# Patient Record
Sex: MALE | Race: BLACK OR AFRICAN AMERICAN | Employment: FULL TIME | URBAN - METROPOLITAN AREA
[De-identification: names, ages, dates, MRNs, and addresses within clinical notes are randomized per-mention and may not be internally consistent; named-entity substitution may affect disease eponyms.]

---

## 2019-04-16 ENCOUNTER — HOSPITAL ENCOUNTER (EMERGENCY)
Age: 59
Discharge: HOME | End: 2019-04-16
Attending: EMERGENCY MEDICINE
Payer: COMMERCIAL

## 2019-04-16 VITALS
WEIGHT: 251 LBS | RESPIRATION RATE: 14 BRPM | SYSTOLIC BLOOD PRESSURE: 135 MMHG | OXYGEN SATURATION: 97 % | HEART RATE: 103 BPM | BODY MASS INDEX: 32.21 KG/M2 | TEMPERATURE: 97.6 F | DIASTOLIC BLOOD PRESSURE: 78 MMHG | HEIGHT: 74 IN

## 2019-04-16 DIAGNOSIS — T78.3XXA ANGIOEDEMA, INITIAL ENCOUNTER: Primary | ICD-10-CM

## 2019-04-16 DIAGNOSIS — R03.0 ELEVATED BLOOD PRESSURE READING: ICD-10-CM

## 2019-04-16 PROCEDURE — 96374 THER/PROPH/DIAG INJ IV PUSH: CPT

## 2019-04-16 PROCEDURE — 2580000003 HC RX 258: Performed by: EMERGENCY MEDICINE

## 2019-04-16 PROCEDURE — 2500000003 HC RX 250 WO HCPCS: Performed by: EMERGENCY MEDICINE

## 2019-04-16 PROCEDURE — 6360000002 HC RX W HCPCS: Performed by: EMERGENCY MEDICINE

## 2019-04-16 PROCEDURE — 6360000002 HC RX W HCPCS

## 2019-04-16 PROCEDURE — 99282 EMERGENCY DEPT VISIT SF MDM: CPT

## 2019-04-16 PROCEDURE — 96375 TX/PRO/DX INJ NEW DRUG ADDON: CPT

## 2019-04-16 PROCEDURE — 96361 HYDRATE IV INFUSION ADD-ON: CPT

## 2019-04-16 RX ORDER — DIPHENHYDRAMINE HYDROCHLORIDE 50 MG/ML
50 INJECTION INTRAMUSCULAR; INTRAVENOUS ONCE
Status: COMPLETED | OUTPATIENT
Start: 2019-04-16 | End: 2019-04-16

## 2019-04-16 RX ORDER — 0.9 % SODIUM CHLORIDE 0.9 %
1000 INTRAVENOUS SOLUTION INTRAVENOUS ONCE
Status: COMPLETED | OUTPATIENT
Start: 2019-04-16 | End: 2019-04-16

## 2019-04-16 RX ORDER — METHYLPREDNISOLONE SODIUM SUCCINATE 125 MG/2ML
INJECTION, POWDER, LYOPHILIZED, FOR SOLUTION INTRAMUSCULAR; INTRAVENOUS
Status: COMPLETED
Start: 2019-04-16 | End: 2019-04-16

## 2019-04-16 RX ORDER — DIPHENHYDRAMINE HCL 25 MG
25-50 CAPSULE ORAL EVERY 6 HOURS PRN
Qty: 20 CAPSULE | Refills: 0 | Status: SHIPPED | OUTPATIENT
Start: 2019-04-16 | End: 2019-04-26

## 2019-04-16 RX ORDER — METHYLPREDNISOLONE SODIUM SUCCINATE 125 MG/2ML
125 INJECTION, POWDER, LYOPHILIZED, FOR SOLUTION INTRAMUSCULAR; INTRAVENOUS DAILY
Status: DISCONTINUED | OUTPATIENT
Start: 2019-04-16 | End: 2019-04-16 | Stop reason: HOSPADM

## 2019-04-16 RX ORDER — AMLODIPINE BESYLATE 5 MG/1
5 TABLET ORAL DAILY
Qty: 15 TABLET | Refills: 2 | Status: ON HOLD | OUTPATIENT
Start: 2019-04-16 | End: 2021-11-13 | Stop reason: HOSPADM

## 2019-04-16 RX ORDER — FAMOTIDINE 20 MG/1
20 TABLET, FILM COATED ORAL 2 TIMES DAILY
Qty: 6 TABLET | Refills: 0 | Status: ON HOLD | OUTPATIENT
Start: 2019-04-16 | End: 2021-11-13 | Stop reason: HOSPADM

## 2019-04-16 RX ORDER — PREDNISONE 20 MG/1
60 TABLET ORAL DAILY
Qty: 9 TABLET | Refills: 0 | Status: SHIPPED | OUTPATIENT
Start: 2019-04-16 | End: 2019-04-19

## 2019-04-16 RX ADMIN — METHYLPREDNISOLONE SODIUM SUCCINATE 125 MG: 125 INJECTION, POWDER, FOR SOLUTION INTRAMUSCULAR; INTRAVENOUS at 06:39

## 2019-04-16 RX ADMIN — METHYLPREDNISOLONE SODIUM SUCCINATE 125 MG: 125 INJECTION, POWDER, LYOPHILIZED, FOR SOLUTION INTRAMUSCULAR; INTRAVENOUS at 06:39

## 2019-04-16 RX ADMIN — DIPHENHYDRAMINE HYDROCHLORIDE 50 MG: 50 INJECTION, SOLUTION INTRAMUSCULAR; INTRAVENOUS at 06:38

## 2019-04-16 RX ADMIN — FAMOTIDINE 20 MG: 10 INJECTION, SOLUTION INTRAVENOUS at 06:38

## 2019-04-16 RX ADMIN — SODIUM CHLORIDE 1000 ML: 9 INJECTION, SOLUTION INTRAVENOUS at 06:38

## 2019-04-16 NOTE — ED PROVIDER NOTES
TRIAGE CHIEF COMPLAINT:   Chief Complaint   Patient presents with    Facial Swelling     Swollen lower lip and hands for 12 hours. HPI: Sean Gentile is a 62 y.o. male who presents to the emergency department with complaint of Swelling of his lower lip as well as swelling and itching of his hands. Patient has been on Zestril for about 10 years. This is his third episode of  Facial swelling since October of last year. His primary care doctor is not aware of these episodes. The patient states the most recent episode flared up yesterday afternoon while he was work starting on the left side of his lower lip. By 7 PM it had spread across the lower lip. He did take some Benadryl around 5 PM last night and again it 9:30 PM last night. He denies upper lip swelling this time but previously he has had it in the upper lip. Denies tongue swelling. He states his cheeks are both swollen. He noted some swelling and itching of his hands which he has never had before. Denies any leg swelling. No rash. Denies chest pain or shortness of breath. The patient took his scheduled dose of Zestril 40 mg this morning at 5:30a.m. and then came here. He denies any new or unusual exposures. He is not on any new medications. REVIEW OF SYSTEMS:   10 systems reviewed. Pertinent positives per HPI. Otherwise noted to be negative. I have reviewed the triage/nursing documentation and agree unless otherwise noted below. PAST MEDICAL HISTORY:   Past Medical History:   Diagnosis Date    Diabetes mellitus ([facility] [address]     Gout     Hyperlipidemia     Hypertension         CURRENT MEDICATIONS:   Patient's Medications   New Prescriptions    No medications on file   Previous Medications    ALLOPURINOL PO    Take  by mouth. HYDROCHLOROTHIAZIDE (HYDRODIURIL) 12.5 MG TABLET    Take 12.5 mg by mouth daily. LISINOPRIL (PRINIVIL;ZESTRIL) 40 MG TABLET    Take 40 mg by mouth daily.     METFORMIN (GLUCOPHAGE) 500 MG TABLET    Take 500 lip.  He does have some swelling of the face and hands which is new. Complains of some mild itching of the hands. No respiratory distress. Lungs are clear. Tongue is normal.  He was treated here with IV fluids, IV Solu-Medrol, Benadryl and Pepcid. Cool compresses were placed to the swollen lower lip. Patient was advised that he needed to stop the ACE inhibitor. He was observed for over 2 hours. There was no worsening of his symptoms. He had slight improvement of the swelling of the lower lip. There was no tongue swelling or stridor. No difficulty swallowing. The floor the mouth was normal.  No upper lip  Swelling was present. Lung sounds were clear on repeat exam.  Recheck blood pressure was 135/78, heart rate 103 and room air O2 sat 100%. The patient was given a three-day course of prednisone, Benadryl and Pepcid in the event that this was an allergic phenomenon but I feel it more likely to be ACE inhibitor induced. The patient understands to stop his lisinopril. He was given a prescription for Norvasc and advised to continue all of his other medications. Advise follow-up with his primary care doctor. He understands to return here for worsening symptoms. I discussed with Miguelina Guzman III the exam results, diagnosis, care, prognosis and the importance of follow-up. The patient is stable for discharge. The patient and/or family are in agreement with the plan and all questions have been answered. Specific discharge instructions were explained, including reasons to return to the emergency department.       (Please note that portions of this note may have been completed with a voice recognition program.  Efforts were made to edit the dictation but occasionally words are mis-transcribed)      FINAL IMPRESSION:  1 -- angioedema, recurrent  2 -- essential hypertension                  Gris Coleman MD  04/16/19 0342

## 2019-04-16 NOTE — ED NOTES
Patient resting quietly watching TV, ice pack given for lip swelling patient has no request.     Nette Sultana, RN  04/16/19 3662

## 2019-06-30 ENCOUNTER — HOSPITAL ENCOUNTER (EMERGENCY)
Age: 59
Discharge: HOME | End: 2019-06-30
Attending: EMERGENCY MEDICINE
Payer: COMMERCIAL

## 2019-06-30 ENCOUNTER — APPOINTMENT (OUTPATIENT)
Dept: GENERAL RADIOLOGY | Age: 59
End: 2019-06-30
Payer: COMMERCIAL

## 2019-06-30 VITALS
HEIGHT: 74 IN | SYSTOLIC BLOOD PRESSURE: 140 MMHG | TEMPERATURE: 98.1 F | BODY MASS INDEX: 32.42 KG/M2 | DIASTOLIC BLOOD PRESSURE: 91 MMHG | HEART RATE: 108 BPM | OXYGEN SATURATION: 99 % | RESPIRATION RATE: 14 BRPM | WEIGHT: 252.6 LBS

## 2019-06-30 DIAGNOSIS — M25.561 ACUTE PAIN OF RIGHT KNEE: ICD-10-CM

## 2019-06-30 DIAGNOSIS — M70.51 PATELLAR BURSITIS OF RIGHT KNEE: Primary | ICD-10-CM

## 2019-06-30 PROCEDURE — 99283 EMERGENCY DEPT VISIT LOW MDM: CPT

## 2019-06-30 PROCEDURE — 73562 X-RAY EXAM OF KNEE 3: CPT

## 2019-06-30 ASSESSMENT — PAIN DESCRIPTION - FREQUENCY: FREQUENCY: INTERMITTENT

## 2019-06-30 ASSESSMENT — PAIN DESCRIPTION - ORIENTATION
ORIENTATION: RIGHT
ORIENTATION: RIGHT

## 2019-06-30 ASSESSMENT — PAIN DESCRIPTION - LOCATION
LOCATION: KNEE
LOCATION: KNEE

## 2019-06-30 ASSESSMENT — PAIN SCALES - GENERAL
PAINLEVEL_OUTOF10: 6
PAINLEVEL_OUTOF10: 6

## 2019-06-30 ASSESSMENT — PAIN DESCRIPTION - PAIN TYPE
TYPE: CHRONIC PAIN
TYPE: CHRONIC PAIN

## 2019-06-30 ASSESSMENT — PAIN DESCRIPTION - DESCRIPTORS
DESCRIPTORS: ACHING
DESCRIPTORS: ACHING

## 2019-06-30 NOTE — ED PROVIDER NOTES
no facial asymmetry, no nasal discharge  Eyes: EOM  Neck: No tracheal deviation or stridor  Skin: no pallor, erythema, lesions or other abnormalities on exposed skin of face and arms  Extremities:   R knee: patella palpated and in normal position with normal turgor, good extensor function of lower extremity albiet at pain at insertion of patellar tendon  Valgus (lateral) stress: no laxity of MCL or significant pain  Varus (medial) stress: no laxity of LCL or significant pain  Anterior/posterior Drawer tests: no laxity of ACL/PCL or significant pain    Neurologic: Alert, oriented x 3. No focal deficits upon moving arms and legs  Psychiatric: Appropriate demeanor without agitation or internal stimulation      MEDICAL DECISION MAKING  Pt here with isolated R knee injury. No evidence of significant neurovascular injury. Suspect patellar tendonitis    Knee immob    Xr:   1. Tricompartmental osteoarthritis, greatest involving the patellofemoral articulation  2. Moderate joint effusion  3. Patellar enthesopathy    Pt declines knee immobilizer    Suspect patellar tendonitis    Images reviewed, patient counseled, and knows to follow up with orthopedics in 1 week or less, especially if symptoms persist, and to follow up sooner if they worsen. DISPOSITION  Home    IMPRESSION:  R knee pain  Patellar bursitis      This medical chart used with aid of transcription software. As such, there may be inadvertent errors in transcription of spellings and words despite physician's attempts to correct all possible errors.          Jason Tena MD  06/30/19 6685

## 2021-11-12 ENCOUNTER — HOSPITAL ENCOUNTER (OUTPATIENT)
Age: 61
Setting detail: OBSERVATION
Discharge: HOME | End: 2021-11-13
Attending: EMERGENCY MEDICINE | Admitting: INTERNAL MEDICINE
Payer: COMMERCIAL

## 2021-11-12 ENCOUNTER — APPOINTMENT (OUTPATIENT)
Dept: GENERAL RADIOLOGY | Age: 61
End: 2021-11-12
Payer: COMMERCIAL

## 2021-11-12 DIAGNOSIS — R42 DIZZINESS: Primary | ICD-10-CM

## 2021-11-12 DIAGNOSIS — R77.8 ELEVATED TROPONIN: ICD-10-CM

## 2021-11-12 DIAGNOSIS — E87.6 HYPOKALEMIA: ICD-10-CM

## 2021-11-12 LAB
A/G RATIO: 1 (ref 1.1–2.2)
ALBUMIN SERPL-MCNC: 3.7 G/DL (ref 3.4–5)
ALP BLD-CCNC: 238 U/L (ref 40–129)
ALT SERPL-CCNC: 52 U/L (ref 10–40)
ANION GAP SERPL CALCULATED.3IONS-SCNC: 11 MMOL/L (ref 3–16)
AST SERPL-CCNC: 100 U/L (ref 15–37)
BASOPHILS ABSOLUTE: 0.1 K/UL (ref 0–0.2)
BASOPHILS RELATIVE PERCENT: 1.4 %
BILIRUB SERPL-MCNC: 0.5 MG/DL (ref 0–1)
BUN BLDV-MCNC: 14 MG/DL (ref 7–20)
CALCIUM SERPL-MCNC: 9.3 MG/DL (ref 8.3–10.6)
CHLORIDE BLD-SCNC: 103 MMOL/L (ref 99–110)
CO2: 24 MMOL/L (ref 21–32)
CREAT SERPL-MCNC: 1.2 MG/DL (ref 0.8–1.3)
EKG ATRIAL RATE: 95 BPM
EKG DIAGNOSIS: NORMAL
EKG P AXIS: 63 DEGREES
EKG P-R INTERVAL: 232 MS
EKG Q-T INTERVAL: 368 MS
EKG QRS DURATION: 106 MS
EKG QTC CALCULATION (BAZETT): 462 MS
EKG R AXIS: -26 DEGREES
EKG T AXIS: 18 DEGREES
EKG VENTRICULAR RATE: 95 BPM
EOSINOPHILS ABSOLUTE: 0 K/UL (ref 0–0.6)
EOSINOPHILS RELATIVE PERCENT: 0.5 %
GFR AFRICAN AMERICAN: >60
GFR NON-AFRICAN AMERICAN: >60
GLUCOSE BLD-MCNC: 191 MG/DL (ref 70–99)
GLUCOSE BLD-MCNC: 82 MG/DL (ref 70–99)
HCT VFR BLD CALC: 33.4 % (ref 40.5–52.5)
HEMOGLOBIN: 11.1 G/DL (ref 13.5–17.5)
LYMPHOCYTES ABSOLUTE: 1.8 K/UL (ref 1–5.1)
LYMPHOCYTES RELATIVE PERCENT: 23.7 %
MAGNESIUM: 1.9 MG/DL (ref 1.8–2.4)
MCH RBC QN AUTO: 27.1 PG (ref 26–34)
MCHC RBC AUTO-ENTMCNC: 33.2 G/DL (ref 31–36)
MCV RBC AUTO: 81.8 FL (ref 80–100)
MONOCYTES ABSOLUTE: 0.8 K/UL (ref 0–1.3)
MONOCYTES RELATIVE PERCENT: 10.4 %
NEUTROPHILS ABSOLUTE: 5 K/UL (ref 1.7–7.7)
NEUTROPHILS RELATIVE PERCENT: 64 %
PDW BLD-RTO: 17 % (ref 12.4–15.4)
PERFORMED ON: NORMAL
PLATELET # BLD: 218 K/UL (ref 135–450)
PMV BLD AUTO: 8.8 FL (ref 5–10.5)
POTASSIUM REFLEX MAGNESIUM: 3.2 MMOL/L (ref 3.5–5.1)
RBC # BLD: 4.08 M/UL (ref 4.2–5.9)
SODIUM BLD-SCNC: 138 MMOL/L (ref 136–145)
TOTAL PROTEIN: 7.4 G/DL (ref 6.4–8.2)
TROPONIN: 0.02 NG/ML
TROPONIN: 0.03 NG/ML
TROPONIN: 0.03 NG/ML
WBC # BLD: 7.8 K/UL (ref 4–11)

## 2021-11-12 PROCEDURE — 6360000002 HC RX W HCPCS: Performed by: INTERNAL MEDICINE

## 2021-11-12 PROCEDURE — 36415 COLL VENOUS BLD VENIPUNCTURE: CPT

## 2021-11-12 PROCEDURE — 83735 ASSAY OF MAGNESIUM: CPT

## 2021-11-12 PROCEDURE — 2580000003 HC RX 258: Performed by: INTERNAL MEDICINE

## 2021-11-12 PROCEDURE — 96372 THER/PROPH/DIAG INJ SC/IM: CPT

## 2021-11-12 PROCEDURE — 80053 COMPREHEN METABOLIC PANEL: CPT

## 2021-11-12 PROCEDURE — 84484 ASSAY OF TROPONIN QUANT: CPT

## 2021-11-12 PROCEDURE — 85025 COMPLETE CBC W/AUTO DIFF WBC: CPT

## 2021-11-12 PROCEDURE — 6370000000 HC RX 637 (ALT 250 FOR IP): Performed by: INTERNAL MEDICINE

## 2021-11-12 PROCEDURE — G0378 HOSPITAL OBSERVATION PER HR: HCPCS

## 2021-11-12 PROCEDURE — 71045 X-RAY EXAM CHEST 1 VIEW: CPT

## 2021-11-12 PROCEDURE — 93010 ELECTROCARDIOGRAM REPORT: CPT | Performed by: INTERNAL MEDICINE

## 2021-11-12 PROCEDURE — 6370000000 HC RX 637 (ALT 250 FOR IP): Performed by: EMERGENCY MEDICINE

## 2021-11-12 PROCEDURE — 99285 EMERGENCY DEPT VISIT HI MDM: CPT

## 2021-11-12 PROCEDURE — 93005 ELECTROCARDIOGRAM TRACING: CPT | Performed by: EMERGENCY MEDICINE

## 2021-11-12 RX ORDER — SODIUM CHLORIDE 9 MG/ML
25 INJECTION, SOLUTION INTRAVENOUS PRN
Status: DISCONTINUED | OUTPATIENT
Start: 2021-11-12 | End: 2021-11-13 | Stop reason: HOSPADM

## 2021-11-12 RX ORDER — ACETAMINOPHEN 325 MG/1
650 TABLET ORAL EVERY 6 HOURS PRN
Status: DISCONTINUED | OUTPATIENT
Start: 2021-11-12 | End: 2021-11-13 | Stop reason: HOSPADM

## 2021-11-12 RX ORDER — DIGOXIN 125 MCG
0.12 TABLET ORAL DAILY
COMMUNITY

## 2021-11-12 RX ORDER — INSULIN LISPRO 100 [IU]/ML
0-12 INJECTION, SOLUTION INTRAVENOUS; SUBCUTANEOUS
Status: DISCONTINUED | OUTPATIENT
Start: 2021-11-12 | End: 2021-11-13 | Stop reason: HOSPADM

## 2021-11-12 RX ORDER — CARVEDILOL 25 MG/1
25 TABLET ORAL 2 TIMES DAILY WITH MEALS
COMMUNITY

## 2021-11-12 RX ORDER — ASPIRIN 81 MG/1
81 TABLET, CHEWABLE ORAL DAILY
Status: DISCONTINUED | OUTPATIENT
Start: 2021-11-12 | End: 2021-11-13 | Stop reason: HOSPADM

## 2021-11-12 RX ORDER — ASPIRIN 81 MG/1
324 TABLET, CHEWABLE ORAL ONCE
Status: COMPLETED | OUTPATIENT
Start: 2021-11-12 | End: 2021-11-12

## 2021-11-12 RX ORDER — MECLIZINE HCL 12.5 MG/1
12.5 TABLET ORAL 3 TIMES DAILY PRN
COMMUNITY
Start: 2021-06-24

## 2021-11-12 RX ORDER — MECLIZINE HYDROCHLORIDE 25 MG/1
25 TABLET ORAL ONCE
Status: COMPLETED | OUTPATIENT
Start: 2021-11-12 | End: 2021-11-12

## 2021-11-12 RX ORDER — DIAPER,BRIEF,INFANT-TODD,DISP
EACH MISCELLANEOUS 2 TIMES DAILY
COMMUNITY

## 2021-11-12 RX ORDER — POLYETHYLENE GLYCOL 3350 17 G/17G
17 POWDER, FOR SOLUTION ORAL DAILY PRN
Status: DISCONTINUED | OUTPATIENT
Start: 2021-11-12 | End: 2021-11-13 | Stop reason: HOSPADM

## 2021-11-12 RX ORDER — ASPIRIN 81 MG/1
81 TABLET, CHEWABLE ORAL DAILY
COMMUNITY

## 2021-11-12 RX ORDER — ACETAMINOPHEN 650 MG/1
650 SUPPOSITORY RECTAL EVERY 6 HOURS PRN
Status: DISCONTINUED | OUTPATIENT
Start: 2021-11-12 | End: 2021-11-13 | Stop reason: HOSPADM

## 2021-11-12 RX ORDER — NICOTINE POLACRILEX 4 MG
15 LOZENGE BUCCAL PRN
Status: DISCONTINUED | OUTPATIENT
Start: 2021-11-12 | End: 2021-11-13 | Stop reason: HOSPADM

## 2021-11-12 RX ORDER — INSULIN LISPRO 100 [IU]/ML
0-6 INJECTION, SOLUTION INTRAVENOUS; SUBCUTANEOUS NIGHTLY
Status: DISCONTINUED | OUTPATIENT
Start: 2021-11-12 | End: 2021-11-13 | Stop reason: HOSPADM

## 2021-11-12 RX ORDER — SODIUM CHLORIDE 0.9 % (FLUSH) 0.9 %
5-40 SYRINGE (ML) INJECTION PRN
Status: DISCONTINUED | OUTPATIENT
Start: 2021-11-12 | End: 2021-11-13 | Stop reason: HOSPADM

## 2021-11-12 RX ORDER — POTASSIUM CHLORIDE 20 MEQ/1
40 TABLET, EXTENDED RELEASE ORAL 2 TIMES DAILY WITH MEALS
Status: DISCONTINUED | OUTPATIENT
Start: 2021-11-12 | End: 2021-11-13 | Stop reason: HOSPADM

## 2021-11-12 RX ORDER — ONDANSETRON 2 MG/ML
4 INJECTION INTRAMUSCULAR; INTRAVENOUS EVERY 6 HOURS PRN
Status: DISCONTINUED | OUTPATIENT
Start: 2021-11-12 | End: 2021-11-13 | Stop reason: HOSPADM

## 2021-11-12 RX ORDER — CARVEDILOL 25 MG/1
25 TABLET ORAL 2 TIMES DAILY WITH MEALS
Status: DISCONTINUED | OUTPATIENT
Start: 2021-11-12 | End: 2021-11-13 | Stop reason: HOSPADM

## 2021-11-12 RX ORDER — DICLOFENAC SODIUM 1 MG/ML
1 SOLUTION/ DROPS OPHTHALMIC 3 TIMES DAILY
COMMUNITY

## 2021-11-12 RX ORDER — MECOBALAMIN 5000 MCG
5 TABLET,DISINTEGRATING ORAL NIGHTLY
Status: DISCONTINUED | OUTPATIENT
Start: 2021-11-12 | End: 2021-11-13 | Stop reason: HOSPADM

## 2021-11-12 RX ORDER — ISOSORBIDE DINITRATE 40 MG/1
40 TABLET ORAL 3 TIMES DAILY
COMMUNITY
Start: 2021-08-09

## 2021-11-12 RX ORDER — HYDROCHLOROTHIAZIDE 25 MG/1
50 TABLET ORAL DAILY
Status: DISCONTINUED | OUTPATIENT
Start: 2021-11-12 | End: 2021-11-13 | Stop reason: HOSPADM

## 2021-11-12 RX ORDER — MECLIZINE HYDROCHLORIDE 25 MG/1
12.5 TABLET ORAL 3 TIMES DAILY PRN
Status: DISCONTINUED | OUTPATIENT
Start: 2021-11-12 | End: 2021-11-13 | Stop reason: HOSPADM

## 2021-11-12 RX ORDER — POTASSIUM CHLORIDE 750 MG/1
10 TABLET, FILM COATED, EXTENDED RELEASE ORAL DAILY
COMMUNITY

## 2021-11-12 RX ORDER — DEXTROSE MONOHYDRATE 25 G/50ML
12.5 INJECTION, SOLUTION INTRAVENOUS PRN
Status: DISCONTINUED | OUTPATIENT
Start: 2021-11-12 | End: 2021-11-13 | Stop reason: HOSPADM

## 2021-11-12 RX ORDER — ALLOPURINOL 300 MG/1
300 TABLET ORAL DAILY
Status: DISCONTINUED | OUTPATIENT
Start: 2021-11-12 | End: 2021-11-13 | Stop reason: HOSPADM

## 2021-11-12 RX ORDER — HYDRALAZINE HYDROCHLORIDE 50 MG/1
50 TABLET, FILM COATED ORAL EVERY 6 HOURS SCHEDULED
Status: DISCONTINUED | OUTPATIENT
Start: 2021-11-12 | End: 2021-11-13 | Stop reason: HOSPADM

## 2021-11-12 RX ORDER — ONDANSETRON 4 MG/1
4 TABLET, ORALLY DISINTEGRATING ORAL EVERY 8 HOURS PRN
Status: DISCONTINUED | OUTPATIENT
Start: 2021-11-12 | End: 2021-11-13 | Stop reason: HOSPADM

## 2021-11-12 RX ORDER — CHLORPROMAZINE HYDROCHLORIDE 25 MG/1
25 TABLET, FILM COATED ORAL DAILY
COMMUNITY
Start: 2021-09-14

## 2021-11-12 RX ORDER — DIGOXIN 125 MCG
125 TABLET ORAL DAILY
Status: DISCONTINUED | OUTPATIENT
Start: 2021-11-12 | End: 2021-11-13 | Stop reason: HOSPADM

## 2021-11-12 RX ORDER — ISOSORBIDE DINITRATE 20 MG/1
40 TABLET ORAL 3 TIMES DAILY
Status: DISCONTINUED | OUTPATIENT
Start: 2021-11-12 | End: 2021-11-13 | Stop reason: HOSPADM

## 2021-11-12 RX ORDER — DAPAGLIFLOZIN 10 MG/1
10 TABLET, FILM COATED ORAL DAILY
COMMUNITY
Start: 2021-10-22

## 2021-11-12 RX ORDER — SODIUM CHLORIDE 0.9 % (FLUSH) 0.9 %
5-40 SYRINGE (ML) INJECTION EVERY 12 HOURS SCHEDULED
Status: DISCONTINUED | OUTPATIENT
Start: 2021-11-12 | End: 2021-11-13 | Stop reason: HOSPADM

## 2021-11-12 RX ORDER — ALPRAZOLAM 0.5 MG/1
0.5 TABLET ORAL NIGHTLY PRN
Status: DISCONTINUED | OUTPATIENT
Start: 2021-11-13 | End: 2021-11-12

## 2021-11-12 RX ORDER — FOLIC ACID 1 MG/1
1 TABLET ORAL DAILY
COMMUNITY

## 2021-11-12 RX ORDER — DEXTROSE MONOHYDRATE 50 MG/ML
100 INJECTION, SOLUTION INTRAVENOUS PRN
Status: DISCONTINUED | OUTPATIENT
Start: 2021-11-12 | End: 2021-11-13 | Stop reason: HOSPADM

## 2021-11-12 RX ORDER — POTASSIUM CHLORIDE 750 MG/1
40 TABLET, EXTENDED RELEASE ORAL 2 TIMES DAILY
Status: DISCONTINUED | OUTPATIENT
Start: 2021-11-12 | End: 2021-11-12 | Stop reason: SDUPTHER

## 2021-11-12 RX ORDER — ALPRAZOLAM 0.5 MG/1
0.5 TABLET ORAL NIGHTLY PRN
Status: DISCONTINUED | OUTPATIENT
Start: 2021-11-12 | End: 2021-11-13 | Stop reason: HOSPADM

## 2021-11-12 RX ORDER — HYDRALAZINE HYDROCHLORIDE 50 MG/1
50 TABLET, FILM COATED ORAL EVERY 6 HOURS
COMMUNITY
Start: 2021-05-25

## 2021-11-12 RX ADMIN — HYDRALAZINE HYDROCHLORIDE 50 MG: 50 TABLET, FILM COATED ORAL at 20:56

## 2021-11-12 RX ADMIN — Medication 5 MG: at 23:11

## 2021-11-12 RX ADMIN — ENOXAPARIN SODIUM 40 MG: 100 INJECTION SUBCUTANEOUS at 21:13

## 2021-11-12 RX ADMIN — CARVEDILOL 25 MG: 25 TABLET, FILM COATED ORAL at 20:55

## 2021-11-12 RX ADMIN — ALPRAZOLAM 0.5 MG: 0.5 TABLET ORAL at 23:10

## 2021-11-12 RX ADMIN — ISOSORBIDE DINITRATE 40 MG: 20 TABLET ORAL at 20:54

## 2021-11-12 RX ADMIN — DIGOXIN 125 MCG: 125 TABLET ORAL at 20:55

## 2021-11-12 RX ADMIN — ASPIRIN 324 MG: 81 TABLET, CHEWABLE ORAL at 09:02

## 2021-11-12 RX ADMIN — ALLOPURINOL 300 MG: 300 TABLET ORAL at 20:55

## 2021-11-12 RX ADMIN — Medication 10 ML: at 20:00

## 2021-11-12 RX ADMIN — HYDROCHLOROTHIAZIDE 50 MG: 25 TABLET ORAL at 20:54

## 2021-11-12 RX ADMIN — MECLIZINE HYDROCHLORIDE 25 MG: 25 TABLET ORAL at 08:27

## 2021-11-12 RX ADMIN — HYDRALAZINE HYDROCHLORIDE 50 MG: 50 TABLET, FILM COATED ORAL at 23:10

## 2021-11-12 RX ADMIN — MECLIZINE HYDROCHLORIDE 12.5 MG: 25 TABLET ORAL at 20:54

## 2021-11-12 RX ADMIN — POTASSIUM CHLORIDE 40 MEQ: 750 TABLET, EXTENDED RELEASE ORAL at 09:02

## 2021-11-12 RX ADMIN — POTASSIUM CHLORIDE 40 MEQ: 1500 TABLET, EXTENDED RELEASE ORAL at 20:55

## 2021-11-12 RX ADMIN — ASPIRIN 81 MG: 81 TABLET, CHEWABLE ORAL at 20:55

## 2021-11-12 ASSESSMENT — PAIN SCALES - GENERAL: PAINLEVEL_OUTOF10: 0

## 2021-11-12 NOTE — ED NOTES
This RN called nursing supervisor at [employer] in regard to bed status, she reported that it would be late afternoon early evening.  Pt made aware, attempted to notify transfer center without success       Andriy Pratt RN  11/12/21 7725

## 2021-11-12 NOTE — ED NOTES
Transfer center on the line with Dr. Alondra Alegria to speak with Dr. Deloris Carranza.      Alejo Cowan, [facility]  11/12/21 7165

## 2021-11-12 NOTE — ED PROVIDER NOTES
Emergency Physician Note        Note Open Time: 8:17 AM EST    Chief Complaint  Dizziness       History of Present Illness  Wicho Rubin III is a 64 y.o. male who presents to the ED for dizziness. Patient reports that this morning on the way to work he felt very dizzy while driving and decided to stop and turn around and come in for evaluation. He denies any chest pain or shortness of breath. No palpitations, nausea or any diaphoresis. He states he did not feel this way during the night or yesterday after getting up today felt dizzy. He has had similar symptoms in the past and does have meclizine at home. He denies any headache. No history of strokes or TIAs. No neck pain. No fevers, chills or sweats. He states while lying still in the bed he has no issue but if he were to get up and walk around he would feel dizzy. 10 systems reviewed, pertinent positives per HPI otherwise noted to be negative    I have reviewed the following from the nursing documentation:      Prior to Admission medications    Medication Sig Start Date End Date Taking? Authorizing Provider   aspirin 81 MG chewable tablet Take 81 mg by mouth daily   Yes Historical Provider, MD   carvedilol (COREG) 25 MG tablet Take 25 mg by mouth 2 times daily (with meals)   Yes Historical Provider, MD   Dapagliflozin-metFORMIN HCl ER  MG TB24 Take by mouth   Yes Historical Provider, MD   diclofenac (VOLTAREN) 0.1 % ophthalmic solution 1 drop 3 times daily   Yes Historical Provider, MD   digoxin (LANOXIN) 125 MCG tablet Take 0.125 mcg by mouth daily   Yes Historical Provider, MD   folic acid (FOLVITE) 1 MG tablet Take 1 mg by mouth daily   Yes Historical Provider, MD   hydrocortisone 1 % cream Apply topically 2 times daily Apply topically 2 times daily.    Yes Historical Provider, MD   famotidine (PEPCID) 20 MG tablet Take 1 tablet by mouth 2 times daily for 3 days 4/16/19 4/19/19  Zhen Encinas MD   amLODIPine (NORVASC) 5 MG tablet Take 1 tablet by mouth daily for 15 days 4/16/19 5/1/19  Michelet Edawrds MD   metFORMIN (GLUCOPHAGE) 500 MG tablet Take 500 mg by mouth 2 times daily (with meals). Historical Provider, MD   ALLOPURINOL PO Take 300 mg by mouth daily     Historical Provider, MD   hydrochlorothiazide (HYDRODIURIL) 12.5 MG tablet Take 50 mg by mouth daily     Historical Provider, MD   Multiple Vitamins-Minerals (CENTRUM SILVER PO) Take  by mouth. Historical Provider, MD       Allergies as of 11/12/2021 - Fully Reviewed 11/12/2021   Allergen Reaction Noted    Amlodipine Other (See Comments) 02/27/2018    Lisinopril  04/16/2019       Past Medical History:   Diagnosis Date    Diabetes mellitus ([facility] [address]     Gout     Heart failure ([facility] [address]     Hyperlipidemia     Hypertension         Surgical History:   Past Surgical History:   Procedure Laterality Date    BACK SURGERY      FOOT SURGERY      KNEE SURGERY          Family History:  History reviewed. No pertinent family history. Social History     Socioeconomic History    Marital status: Single     Spouse name: Not on file    Number of children: Not on file    Years of education: Not on file    Highest education level: Not on file   Occupational History    Not on file   Tobacco Use    Smoking status: Former Smoker    Smokeless tobacco: Never Used   Substance and Sexual Activity    Alcohol use: Not Currently     Comment: daily beers    Drug use: No    Sexual activity: Not on file   Other Topics Concern    Not on file   Social History Narrative    Not on file     Social Determinants of Health     Financial Resource Strain:     Difficulty of Paying Living Expenses: Not on file   Food Insecurity:     Worried About [address] in the Last Year: Not on file    Jossie of Food in the Last Year: Not on file   Transportation Needs:     Lack of Transportation (Medical): Not on file    Lack of Transportation (Non-Medical):  Not on file   Physical Activity:     Days of Exercise per Week: Not on file    Minutes of Exercise per Session: Not on file   Stress:     Feeling of Stress : Not on file   Social Connections:     Frequency of Communication with Friends and Family: Not on file    Frequency of Social Gatherings with Friends and Family: Not on file    Attends Jew Services: Not on file    Active Member of [address] [place] [address] [employer] or Organizations: Not on file    Attends Club or Organization Meetings: Not on file    Marital Status: Not on file   Intimate Partner Violence:     Fear of Current or Ex-Partner: Not on file    Emotionally Abused: Not on file    Physically Abused: Not on file    Sexually Abused: Not on file   Housing Stability:     Unable to Pay for Housing in the Last Year: Not on file    Number of [place] in the Last Year: Not on file    Unstable Housing in the Last Year: Not on file       Nursing notes reviewed. ED Triage Vitals [11/12/21 0752]   Enc Vitals Group      BP (!) 129/90      Pulse 92      Resp 18      Temp 98.2 °F (36.8 °C)      Temp Source Oral      SpO2 97 %      Weight 216 lb (98 kg)      Height 6' 2\" (1.88 m)      Head Circumference       Peak Flow       Pain Score       Pain Loc       Pain Edu? Excl. in [address]? GENERAL:  Awake, alert. Well developed, well nourished with no apparent distress. HENT:  Normocephalic, Atraumatic, moist mucous membranes. Posterior oropharynx without erythema, edema or exudate. Bilateral TMs without erythema or effusion. [place]-Hallpike maneuver positive to the right and negative to the left. EYES:  Pupils equal round and reactive to light, Conjunctiva normal, extraocular movements normal.  NECK:  No meningeal signs, Supple. CHEST:  Regular rate and rhythm, chest wall non-tender. LUNGS:  Clear to auscultation bilaterally. ABDOMEN:  Soft, non-tender, no rebound, rigidity or guarding, non-distended, normal bowel sounds. No costovertebral angle tenderness to palpation. BACK:  No tenderness. EXTREMITIES:  Normal range of motion, no edema, no bony tenderness, no deformity, distal pulses present. SKIN: Warm, dry and intact. NEUROLOGIC: Erwin Reynolds III's NIH Stroke Scale is:  Level of Consciousness:  0 - alert; keenly responsive  LOC Questions:  0 - answers both questions correctly  LOC Commands:  0 - performs both tasks correctly  Best Gaze:  0 - normal  Visual Fields:  0 - no visual loss  Facial Palsy:  0 - normal symmetric movement  Motor-Arm-Left:  0 - no drift, limb holds 90 (or 45) degrees for full 10 seconds  Motor-Leg-Left:  0 - no drift; leg holds 30 degree position for full 5 seconds  Motor-Arm-Right:  0 - no drift, limb holds 90 (or 45) degrees for full 10 seconds  Motor-Leg-Right:  0 - no drift; leg holds 30 degree position for full 5 seconds  Limb Ataxia:  0 - absent  Sensory:  0 - normal; no sensory loss  Best Language:  0 - no aphasia, normal  Dysarthria:  0 - normal  Extinction and Inattention:  0 - no abnormality  Reassuring hints exam.    LABS and DIAGNOSTIC RESULTS  EKG  The Ekg interpreted by me shows  normal sinus rhythm with a rate of 95  Axis is   Normal  QTc is  normal  First-degree AV block     ST Segments: normal  Delta waves, Brugada Syndrome, and Short IN are not present. No prior EKG available for comparison. RADIOLOGY  X-RAYS:  I have reviewed radiologic plain film image(s). ALL OTHER NON-PLAIN FILM IMAGES SUCH AS CT, ULTRASOUND AND MRI HAVE BEEN READ BY THE RADIOLOGIST. XR CHEST PORTABLE   Final Result      1. No acute disease.                  LABS  Labs Reviewed   CBC WITH AUTO DIFFERENTIAL - Abnormal; Notable for the following components:       Result Value    RBC 4.08 (*)     Hemoglobin 11.1 (*)     Hematocrit 33.4 (*)     RDW 17.0 (*)     All other components within normal limits    Narrative:     Performed at:  [facility]) - [facility]  [address],  [address]   Phone [phone]   COMPREHENSIVE METABOLIC PANEL W/ REFLEX TO MG FOR LOW K - Abnormal; Notable for the following components:    Potassium reflex Magnesium 3.2 (*)     Glucose 191 (*)     Albumin/Globulin Ratio 1.0 (*)     Alkaline Phosphatase 238 (*)     ALT 52 (*)      (*)     All other components within normal limits    Narrative:     Performed at:  [place] [address],  [employer] [facility] [employer]   Phone [phone]   TROPONIN - Abnormal; Notable for the following components:    Troponin 0.03 (*)     All other components within normal limits    Narrative:     Performed at:  [place] [address]   Phone [phone]   TROPONIN - Abnormal; Notable for the following components:    Troponin 0.03 (*)     All other components within normal limits    Narrative:     Performed at:  [place] [address] [employer]   Phone [phone]   MAGNESIUM    Narrative:     Performed at:  [place] [address]  [employer] [facility] [employer]   Phone [phone]        I spoke with the nurse practitioner on-call for Dr. Maryann Hodgkins, cardiology, and she stated they would see the patient in consultation if the patient was admitted to telemetry at [facility]. I then spoke with the hospitalist at [facility] accepted the patient in transfer. After waiting for several hours to be transferred to [facility] we were informed that he may not get a bed there until at least sometime tomorrow and at this point I discussed with the patient again and he agreed his transportation and transferred to [place] [address] time is 30 minutes which excludes separately billable procedures.   The critical care was concerning treatment of hypokalemia with replacement and treatment of elevated troponin with

## 2021-11-12 NOTE — ED NOTES
Pt informed that transfer center stated PERE[facility] pruitt said late tonight early tomorrow, pt states he would rather go to [facility]  11/12/21 3139

## 2021-11-13 VITALS
TEMPERATURE: 98.8 F | RESPIRATION RATE: 18 BRPM | BODY MASS INDEX: 28.38 KG/M2 | WEIGHT: 221.12 LBS | SYSTOLIC BLOOD PRESSURE: 121 MMHG | DIASTOLIC BLOOD PRESSURE: 65 MMHG | HEIGHT: 74 IN | OXYGEN SATURATION: 97 % | HEART RATE: 88 BPM

## 2021-11-13 LAB
ALBUMIN SERPL-MCNC: 3.3 G/DL (ref 3.4–5)
ALP BLD-CCNC: 219 U/L (ref 40–129)
ALT SERPL-CCNC: 55 U/L (ref 10–40)
ANION GAP SERPL CALCULATED.3IONS-SCNC: 13 MMOL/L (ref 3–16)
AST SERPL-CCNC: 107 U/L (ref 15–37)
BILIRUB SERPL-MCNC: 0.7 MG/DL (ref 0–1)
BILIRUBIN DIRECT: 0.3 MG/DL (ref 0–0.3)
BILIRUBIN, INDIRECT: 0.4 MG/DL (ref 0–1)
BUN BLDV-MCNC: 11 MG/DL (ref 7–20)
CALCIUM SERPL-MCNC: 8.6 MG/DL (ref 8.3–10.6)
CHLORIDE BLD-SCNC: 102 MMOL/L (ref 99–110)
CO2: 23 MMOL/L (ref 21–32)
CREAT SERPL-MCNC: 1.2 MG/DL (ref 0.8–1.3)
GFR AFRICAN AMERICAN: >60
GFR NON-AFRICAN AMERICAN: >60
GLUCOSE BLD-MCNC: 105 MG/DL (ref 70–99)
GLUCOSE BLD-MCNC: 119 MG/DL (ref 70–99)
GLUCOSE BLD-MCNC: 130 MG/DL (ref 70–99)
HCT VFR BLD CALC: 34.7 % (ref 40.5–52.5)
HEMOGLOBIN: 11 G/DL (ref 13.5–17.5)
MAGNESIUM: 1.8 MG/DL (ref 1.8–2.4)
MCH RBC QN AUTO: 26.6 PG (ref 26–34)
MCHC RBC AUTO-ENTMCNC: 31.8 G/DL (ref 31–36)
MCV RBC AUTO: 83.7 FL (ref 80–100)
PDW BLD-RTO: 17.5 % (ref 12.4–15.4)
PERFORMED ON: ABNORMAL
PERFORMED ON: ABNORMAL
PLATELET # BLD: 193 K/UL (ref 135–450)
PMV BLD AUTO: 8.7 FL (ref 5–10.5)
POTASSIUM REFLEX MAGNESIUM: 3.5 MMOL/L (ref 3.5–5.1)
RBC # BLD: 4.15 M/UL (ref 4.2–5.9)
SODIUM BLD-SCNC: 138 MMOL/L (ref 136–145)
TOTAL PROTEIN: 6.9 G/DL (ref 6.4–8.2)
WBC # BLD: 7.2 K/UL (ref 4–11)

## 2021-11-13 PROCEDURE — 99205 OFFICE O/P NEW HI 60 MIN: CPT | Performed by: INTERNAL MEDICINE

## 2021-11-13 PROCEDURE — 85027 COMPLETE CBC AUTOMATED: CPT

## 2021-11-13 PROCEDURE — 6370000000 HC RX 637 (ALT 250 FOR IP): Performed by: INTERNAL MEDICINE

## 2021-11-13 PROCEDURE — 36415 COLL VENOUS BLD VENIPUNCTURE: CPT

## 2021-11-13 PROCEDURE — 2580000003 HC RX 258: Performed by: INTERNAL MEDICINE

## 2021-11-13 PROCEDURE — 80048 BASIC METABOLIC PNL TOTAL CA: CPT

## 2021-11-13 PROCEDURE — G0378 HOSPITAL OBSERVATION PER HR: HCPCS

## 2021-11-13 PROCEDURE — 83735 ASSAY OF MAGNESIUM: CPT

## 2021-11-13 PROCEDURE — 80076 HEPATIC FUNCTION PANEL: CPT

## 2021-11-13 PROCEDURE — 99219 PR INITIAL OBSERVATION CARE/DAY 50 MINUTES: CPT | Performed by: OTOLARYNGOLOGY

## 2021-11-13 RX ADMIN — ISOSORBIDE DINITRATE 40 MG: 20 TABLET ORAL at 08:57

## 2021-11-13 RX ADMIN — HYDRALAZINE HYDROCHLORIDE 50 MG: 50 TABLET, FILM COATED ORAL at 11:59

## 2021-11-13 RX ADMIN — Medication 5 ML: at 08:57

## 2021-11-13 RX ADMIN — ISOSORBIDE DINITRATE 40 MG: 20 TABLET ORAL at 13:36

## 2021-11-13 RX ADMIN — ASPIRIN 81 MG: 81 TABLET, CHEWABLE ORAL at 08:57

## 2021-11-13 RX ADMIN — HYDROCHLOROTHIAZIDE 50 MG: 25 TABLET ORAL at 08:57

## 2021-11-13 RX ADMIN — CARVEDILOL 25 MG: 25 TABLET, FILM COATED ORAL at 08:57

## 2021-11-13 RX ADMIN — HYDRALAZINE HYDROCHLORIDE 50 MG: 50 TABLET, FILM COATED ORAL at 05:57

## 2021-11-13 RX ADMIN — POTASSIUM CHLORIDE 40 MEQ: 1500 TABLET, EXTENDED RELEASE ORAL at 08:57

## 2021-11-13 RX ADMIN — ALLOPURINOL 300 MG: 300 TABLET ORAL at 08:57

## 2021-11-13 RX ADMIN — DIGOXIN 125 MCG: 125 TABLET ORAL at 08:57

## 2021-11-13 ASSESSMENT — PAIN SCALES - GENERAL
PAINLEVEL_OUTOF10: 0

## 2021-11-13 NOTE — CARE COORDINATION
Case Management Assessment            Discharge Note                    Date / Time of Note: 11/13/2021 12:42 PM                  Discharge Note Completed by: Steven Burkett RN    Patient Name: Mariposa Jensen III   YOB: 1960  Diagnosis: Hypokalemia [E87.6]  Dizziness [R42]  Elevated troponin [R77.8]   Date / Time: 11/12/2021  7:42 AM    Current PCP: Toño Marks MD  Clinic patient: No    Hospitalization in the last 30 days: No    Advance Directives:  Code Status: Full Code  [place] DNR form completed and on chart: Not Indicated    Financial:  Payor: Faye [address] 150 / Plan: Mamadou Robertson / Product Type: *No Product type* /      Pharmacy:  No Pharmacies [facility] medications?:    Assistance provided by Case Management: None at this time    Does patient want to participate in local refill/ meds to beds program?:      Meds To [place] Rules:  1. Can ONLY be done Monday- Friday between 8:30am-5pm  2. Prescription(s) must be in pharmacy by 3pm to be filled same day  3. Copy of patient's insurance/ prescription drug card and patient face sheet must be sent along with the prescription(s)  4. Cost of Rx cannot be added to hospital bill. If financial assistance is needed, please contact unit  or ;  or  CANNOT provide pharmacy voucher for patients co-pays  5.  Patients can then  the prescription on their way out of the hospital at discharge, or pharmacy can deliver to the bedside if staff is available. (payment due at time of pick-up or delivery - cash, check, or card accepted)     Able to afford home medications/ co-pay costs: Yes    ADLS:  Current PT AM-PAC Score:   /24  Current OT AM-PAC Score:   /24      DISCHARGE Disposition: Home- No Services Needed    LOC at discharge: Not Applicable  AMANDA Completed: Not Indicated    Notification completed in HENS/PAS?:  Not Applicable    IMM Completed:   Not Indicated    Transportation:  Transportation PLAN for discharge: family   Mode of Transport: Private Car    Additional CM Notes: CM met with patient at bedside, patient plan to return home with no needs. Patient wife will transport at time of discharge. The Plan for Transition of Care is related to the following treatment goals of Hypokalemia [E87.6]  Dizziness [R42]  Elevated troponin [R77.8]    The Patient and/or patient representative Cassidy Jang and his family were provided with a choice of provider and agrees with the discharge plan Not Indicated    Freedom of choice list was provided with basic dialogue that supports the patient's individualized plan of care/goals and shares the quality data associated with the providers.  Not Indicated    Care Transitions patient: No    Alejandra Michelle RN  [facility]  Case Management Department  Ph: [phone]

## 2021-11-13 NOTE — PLAN OF CARE
Goal: Hemodynamic stability will improve  Description: Hemodynamic stability will improve  Outcome: Met This Shift  Pt hypertensive BP 170s/100s on arrival to floor, asymptomatic; administered PO isosorbide, hydralazine, carvedilol, and digoxin. BP WDL thereafter. Pt c/o ongoing vertigo. Discussed/demonstrated Epley Maneuver with pt and administered PRN meclizine as needed.      Problem: Cardiac:  Goal: Ability to maintain an adequate cardiac output will improve  Description: Ability to maintain an adequate cardiac output will improve  Outcome: Met This Shift  Goal: Complications related to the disease process, condition or treatment will be avoided or minimized  Description: Complications related to the disease process, condition or treatment will be avoided or minimized  Outcome: Met This Shift  Goal: Risk factors for ineffective tissue perfusion will decrease  Description: Risk factors for ineffective tissue perfusion will decrease  Outcome: Met This Shift

## 2021-11-13 NOTE — CONSULTS
Cardiology Consultation                                                                    Pt Name: Yolanda Aguilera III  Age: 64 y.o. Sex: male  : 1960  Location: [facility]/[id number]    Referring Physician: Carlos Alberto Henderson MD  Primary cardiologist: [address] [place] [address] Cardiology      Reason for Consult:     Reason for Consultation/Chief Complaint: Elevated troponin    HPI:      Deysi Muniz is a 64 y.o. male with a past medical history of HTN, HLP, DM, alcohol abuse, HFrEF. Echo 2021: LV normal size, LVEF 45-50%, otherwise normal echo. Nuclear stress 2021: Normal except for LV dysfunction. Patient presented to the emergency room on  with dizziness while driving to work. Dizziness has been associated with turning his head to the right side. Otherwise patient denies any current or recent ischemic or congestive symptoms. During work-up, patient had troponin checked which was borderline abnormal at 0.03 and 0.02. Orthostatic vital signs negative, creatinine stable at 1.2. Patient was seen by ENT, symptoms were consistent with benign positional vertigo involving the right ear, conservative approach was recommended. Cardiology was consulted for the abnormal troponin. ECG consistent with NSR, first-degree AV block, nonspecific changes. Histories     Past Medical History:   has a past medical history of Diabetes mellitus ([address] Gout, Heart failure ([address] Hyperlipidemia, and Hypertension. Surgical History:   has a past surgical history that includes back surgery; Foot surgery; and knee surgery. Social History:   reports that he has quit smoking. He has never used smokeless tobacco. He reports previous alcohol use. He reports that he does not use drugs. Family History:  No evidence for sudden cardiac death or premature CAD      Medications:       Home Medications  Were reviewed and are listed in nursing record.  and/or listed below  Prior to Admission medications    Medication Sig Start Date End Date Taking? Authorizing Provider   aspirin 81 MG chewable tablet Take 81 mg by mouth daily   Yes Historical Provider, MD   carvedilol (COREG) 25 MG tablet Take 25 mg by mouth 2 times daily (with meals)   Yes Historical Provider, MD   Dapagliflozin-metFORMIN HCl ER  MG TB24 Take by mouth   Yes Historical Provider, MD   diclofenac (VOLTAREN) 0.1 % ophthalmic solution 1 drop 3 times daily   Yes Historical Provider, MD   digoxin (LANOXIN) 125 MCG tablet Take 0.125 mcg by mouth daily   Yes Historical Provider, MD   folic acid (FOLVITE) 1 MG tablet Take 1 mg by mouth daily   Yes Historical Provider, MD   hydrocortisone 1 % cream Apply topically 2 times daily Apply topically 2 times daily. Yes Historical Provider, MD   FARXIGA 10 MG tablet Take 10 mg by mouth daily 10/22/21  Yes Historical Provider, MD   chlorproMAZINE (THORAZINE) 25 MG tablet Take 25 mg by mouth daily 9/14/21  Yes Historical Provider, MD   hydrALAZINE (APRESOLINE) 50 MG tablet Take 50 mg by mouth every 6 hours 5/25/21  Yes Historical Provider, MD   isosorbide dinitrate (ISORDIL) 40 MG tablet Take 40 mg by mouth 3 times daily 8/9/21  Yes Historical Provider, MD   meclizine (ANTIVERT) 12.5 MG tablet Take 12.5 mg by mouth 3 times daily as needed 6/24/21  Yes Historical Provider, MD   ALLOPURINOL PO Take 300 mg by mouth daily    Yes Historical Provider, MD   hydrochlorothiazide (HYDRODIURIL) 12.5 MG tablet Take 50 mg by mouth daily    Yes Historical Provider, MD   Multiple Vitamins-Minerals (CENTRUM SILVER PO) Take  by mouth. Yes Historical Provider, MD   potassium chloride (KLOR-CON) 10 MEQ extended release tablet Take 10 mEq by mouth daily    Historical Provider, MD   metFORMIN (GLUCOPHAGE) 500 MG tablet Take 500 mg by mouth 2 times daily (with meals).     Historical Provider, MD          Inpatient Medications:   insulin lispro  0-12 Units SubCUTAneous TID WC    insulin lispro  0-6 Units SubCUTAneous Nightly    allopurinol  300 mg Oral Daily    aspirin  81 mg Oral Daily    carvedilol  25 mg Oral BID WC    digoxin  125 mcg Oral Daily    hydroCHLOROthiazide  50 mg Oral Daily    potassium chloride  40 mEq Oral BID     sodium chloride flush  5-40 mL IntraVENous 2 times per day    enoxaparin  40 mg SubCUTAneous Daily    hydrALAZINE  50 mg Oral 4 times per day    isosorbide dinitrate  40 mg Oral TID    melatonin  5 mg Oral Nightly       IV drips:   dextrose      sodium chloride         PRN:  glucose, dextrose, glucagon (rDNA), dextrose, sodium chloride flush, sodium chloride, ondansetron **OR** ondansetron, polyethylene glycol, acetaminophen **OR** acetaminophen, meclizine, ALPRAZolam    Allergy:     Amlodipine and Lisinopril       Review of Systems:     All 12 point review of symptoms completed. Pertinent positives identified in the HPI, all other review of symptoms negative as below. CONSTITUTIONAL: No fatigue  SKIN: No rash or pruritis. EYES: No visual changes or diplopia. No scleral icterus. ENT: No Headaches, hearing loss or vertigo. No mouth sores or sore throat. CARDIOVASCULAR: No chest pain/chest pressure/chest discomfort. No palpitations. No edema. RESPIRATORY: No dyspnea. No cough or wheezing, no sputum production. GASTROINTESTINAL: No N/V/D. No abdominal pain, appetite loss, blood in stools. GENITOURINARY: No dysuria, trouble voiding, or hematuria. MUSCULOSKELETAL:  No gait disturbance, weakness or joint complaints. NEUROLOGICAL: dizziness  ENDOCRINE: No excessive thirst, fluid intake, or urination. No tremor. HEMATOLOGIC: No abnormal bruising or bleeding. ALLERGY: No nasal congestion or hives.       Physical Examination:     Vitals:    11/12/21 2321 11/13/21 0557 11/13/21 0837 11/13/21 1049   BP: 134/76 (!) 147/70 (!) 143/74 121/65   Pulse: 86 92 100 88   Resp: 18 20 20 18   Temp: 97.1 °F (36.2 °C) 98.4 °F (36.9 °C) 97.8 °F (36.6 °C) 98.8 °F (37.1 °C)   TempSrc: Oral Oral Oral Oral   SpO2: 99% 100% 99% 97% Weight:  221 lb 1.9 oz (100.3 kg)     Height:           Wt Readings from Last 3 Encounters:   11/13/21 221 lb 1.9 oz (100.3 kg)   06/30/19 252 lb 9.6 oz (114.6 kg)   04/16/19 251 lb (113.9 kg)         General Appearance:  Alert, cooperative, no distress, appears stated age Appropriate weight   Head:  Normocephalic, without obvious abnormality, atraumatic   Eyes:  PERRL, conjunctiva/corneas clear EOM intact  Ears normal   Throat no lesions       Nose: Nares normal, no drainage or sinus tenderness   Throat: Lips, mucosa, and tongue normal   Neck: Supple, symmetrical, trachea midline, no adenopathy, thyroid: not enlarged, symmetric, no tenderness/mass/nodules, no carotid bruit. Lungs:   Respirations unlabored, clear to auscultation bilaterally, without any wheezes, rubs or ronchi. Chest Wall:  No tenderness or deformity   Heart:  Regular rhythm, rate is controlled, S1, S2 normal, there is no murmur, there is no rub or gallop, cannot assess jvd, no bilateral lower extremity edema   Abdomen:   Soft, non-tender, bowel sounds active all four quadrants,  no masses, no organomegaly       Extremities: Extremities normal, atraumatic, no cyanosis. Pulses: 2+ and symmetric   Skin: Skin color, texture, turgor normal, no rashes or lesions   Pysch: Normal mood and affect   Neurologic: Normal gross motor and sensory exam.  Cranial nerves intact        Labs:     Recent Labs     11/12/21  0800 11/13/21  0606    138   K 3.2* 3.5   BUN 14 11   CREATININE 1.2 1.2    102   CO2 24 23   GLUCOSE 191* 119*   CALCIUM 9.3 8.6   MG 1.90 1.80     Recent Labs     11/12/21  0800 11/12/21  0800 11/13/21  0606   WBC 7.8  --  7.2   HGB 11.1*  --  11.0*   HCT 33.4*  --  34.7*     --  193   MCV 81.8   < > 83.7    < > = values in this interval not displayed. No results for input(s): CHOLTOT, TRIG, HDL in the last 72 hours.     Invalid input(s): LIPIDCOMM, CHOLHDL, VLDCHOL, LDL  No results for input(s): PTT, INR in the last 72 hours. Invalid input(s): PT  Recent Labs     11/12/21  0800 11/12/21  1359 11/12/21 2019   TROPONINI 0.03* 0.03* 0.02*     No results for input(s): BNP in the last 72 hours. No results for input(s): TSH in the last 72 hours. No results for input(s): CHOL, HDL, LDLCALC, TRIG in the last 72 hours.]    Lab Results   Component Value Date    TROPONINI 0.02 (H) 11/12/2021         Imaging:     Telemetry personally reviewed:  NSR      Assessment / Plan:     1. Dizziness due to benign positional vertigo. 2.  Elevated troponin. Troponin elevation is borderline and nonspecific at this time in view of recent nuclear stress test which was unremarkable and did not show any perfusion defects, nonischemic ECG, lack of concerning symptoms. I doubt ACS. 3.  Hypertension. BP is well controlled  4. Chronic HFrEF. Patient is euvolemic and hemodynamically stable. Patient's systolic heart failure could be ischemic (CAD) versus nonischemic (alcohol abuse). He is NYHA class I, ACC stage C.        -Patient has an appointment with his cardiologist next Wednesday. I will defer possible ischemic evaluation with [facility] to his cardiologist.  I doubt any ACS with this admission.  -Continue with aspirin, carvedilol 25 twice daily, digoxin, hydralazine 50 every 8 hours, hydrochlorothiazide 50 daily, isosorbide dinitrate 40 mg every 8 hours. Patient may be discharged home today on the above meds and follow up with me in 1 week with a BMP prior to visit. Please call me with any questions. I have personally reviewed the reports and images of labs, radiological studies, cardiac studies including ECG's and telemetry, current and old medical records. The note was completed using EMR and Dragon dictation system. Every effort was made to ensure accuracy; however, inadvertent computerized transcription errors may be present. All questions and concerns were addressed to the patient/family.  Alternatives to my treatment were discussed. I would like to thank you for providing me the opportunity to participate in the care of your patient. If you have any questions, please do not hesitate to contact me.      Chilango Lorenzo MD, [facility], [address]  The [address]  [address] [place] [address] Kirsten Wylie [address]  Ph: [phone]  Fax: [phone]

## 2021-11-13 NOTE — CONSULTS
AUNDREA SERRATO M.D. Consult Note      CHIEF COMPLAINT: Dizziness    HISTORY OF PRESENT ILLNESS:      The patient is a 64 y.o. male who presents with the onset of dizziness yesterday morning while driving to work. True vertigo. With some nausea. No change in hearing in either ear. No tinnitus in either ear or fullness in either ear. No antecedent respiratory infection. The episode lasted around an hour. Patient was seen in the emergency department and was noted to have an elevated troponin and for this reason he was admitted to [facility]. The patient's dizziness has resolved. For the last several months he has had dizziness whenever he lies down in bed and turns his head to one side. The dizziness is fleeting.       Current Medications:   Current Facility-Administered Medications: insulin lispro (1 Unit Dial) 0-12 Units, 0-12 Units, SubCUTAneous, TID WC  insulin lispro (1 Unit Dial) 0-6 Units, 0-6 Units, SubCUTAneous, Nightly  glucose (GLUTOSE) 40 % oral gel 15 g, 15 g, Oral, PRN  dextrose 50 % IV solution, 12.5 g, IntraVENous, PRN  glucagon (rDNA) injection 1 mg, 1 mg, IntraMUSCular, PRN  dextrose 5 % solution, 100 mL/hr, IntraVENous, PRN  allopurinol (ZYLOPRIM) tablet 300 mg, 300 mg, Oral, Daily  aspirin chewable tablet 81 mg, 81 mg, Oral, Daily  carvedilol (COREG) tablet 25 mg, 25 mg, Oral, BID WC  digoxin (LANOXIN) tablet 125 mcg, 125 mcg, Oral, Daily  hydroCHLOROthiazide (HYDRODIURIL) tablet 50 mg, 50 mg, Oral, Daily  potassium chloride (KLOR-CON M) extended release tablet 40 mEq, 40 mEq, Oral, BID WC  sodium chloride flush 0.9 % injection 5-40 mL, 5-40 mL, IntraVENous, 2 times per day  sodium chloride flush 0.9 % injection 5-40 mL, 5-40 mL, IntraVENous, PRN  0.9 % sodium chloride infusion, 25 mL, IntraVENous, PRN  enoxaparin (LOVENOX) injection 40 mg, 40 mg, SubCUTAneous, Daily  ondansetron (ZOFRAN-ODT) disintegrating tablet 4 mg, 4 mg, Oral, Q8H PRN **OR** ondansetron (ZOFRAN) injection 4 mg, 4 mg, IntraVENous, Q6H PRN  polyethylene glycol (GLYCOLAX) packet 17 g, 17 g, Oral, Daily PRN  acetaminophen (TYLENOL) tablet 650 mg, 650 mg, Oral, Q6H PRN **OR** acetaminophen (TYLENOL) suppository 650 mg, 650 mg, Rectal, Q6H PRN  hydrALAZINE (APRESOLINE) tablet 50 mg, 50 mg, Oral, 4 times per day  isosorbide dinitrate (ISORDIL) tablet 40 mg, 40 mg, Oral, TID  meclizine (ANTIVERT) tablet 12.5 mg, 12.5 mg, Oral, TID PRN  melatonin disintegrating tablet 5 mg, 5 mg, Oral, Nightly  ALPRAZolam (XANAX) tablet 0.5 mg, 0.5 mg, Oral, Nightly PRN  Allergies:  Amlodipine and Lisinopril    Social History     Tobacco Use   Smoking Status Former Smoker   Smokeless Tobacco Never Used     Social History     Substance and Sexual Activity   Alcohol Use Not Currently    Comment: daily beers       Current Facility-Administered Medications:     insulin lispro (1 Unit Dial) 0-12 Units, 0-12 Units, SubCUTAneous, TID Chivo MD    insulin lispro (1 Unit Dial) 0-6 Units, 0-6 Units, SubCUTAneous, Nightly, Chivo Jarvis MD    glucose (GLUTOSE) 40 % oral gel 15 g, 15 g, Oral, PRN, Chivo Jarvis MD    dextrose 50 % IV solution, 12.5 g, IntraVENous, PRN, Chivo Jarvis MD    glucagon (rDNA) injection 1 mg, 1 mg, IntraMUSCular, PRN, Chivo Jarvis MD    dextrose 5 % solution, 100 mL/hr, IntraVENous, PRN, Chivo Jarvis MD    allopurinol (ZYLOPRIM) tablet 300 mg, 300 mg, Oral, Daily, Chivo Jarvis MD, 300 mg at 11/13/21 0857    aspirin chewable tablet 81 mg, 81 mg, Oral, Daily, Chivo Jarvis MD, 81 mg at 11/13/21 0857    carvedilol (COREG) tablet 25 mg, 25 mg, Oral, BID , Chivo Jarvis MD, 25 mg at 11/13/21 0857    digoxin (LANOXIN) tablet 125 mcg, 125 mcg, Oral, Daily, Chivo Jarvis MD, 125 mcg at 11/13/21 0857    hydroCHLOROthiazide (HYDRODIURIL) tablet 50 mg, 50 mg, Oral, Daily, Chivo Jarvis MD, 50 mg at 11/13/21 0857    potassium chloride Dannielle SAUNDERS) extended release tablet 40 mEq, 40 mEq, Oral, BID WC, Chivo Aponte MD, 40 mEq at 11/13/21 0857    sodium chloride flush 0.9 % injection 5-40 mL, 5-40 mL, IntraVENous, 2 times per day, Chivo Aponte MD, 5 mL at 11/13/21 0857    sodium chloride flush 0.9 % injection 5-40 mL, 5-40 mL, IntraVENous, PRN, Chivo Aponte MD    0.9 % sodium chloride infusion, 25 mL, IntraVENous, PRN, Chivo Aponte MD    enoxaparin (LOVENOX) injection 40 mg, 40 mg, SubCUTAneous, Daily, Chivo Aponte MD, 40 mg at 11/12/21 2113    ondansetron (ZOFRAN-ODT) disintegrating tablet 4 mg, 4 mg, Oral, Q8H PRN **OR** ondansetron (ZOFRAN) injection 4 mg, 4 mg, IntraVENous, Q6H PRN, Chivo Aponte MD    polyethylene glycol (GLYCOLAX) packet 17 g, 17 g, Oral, Daily PRN, Chivo Aponte MD    acetaminophen (TYLENOL) tablet 650 mg, 650 mg, Oral, Q6H PRN **OR** acetaminophen (TYLENOL) suppository 650 mg, 650 mg, Rectal, Q6H PRN, Chivo Aponte MD    hydrALAZINE (APRESOLINE) tablet 50 mg, 50 mg, Oral, 4 times per day, Chivo Aponte MD, 50 mg at 11/13/21 0557    isosorbide dinitrate (ISORDIL) tablet 40 mg, 40 mg, Oral, TID, Chivo Aponte MD, 40 mg at 11/13/21 0857    meclizine (ANTIVERT) tablet 12.5 mg, 12.5 mg, Oral, TID PRN, Chivo Aponte MD, 12.5 mg at 11/12/21 2054    melatonin disintegrating tablet 5 mg, 5 mg, Oral, Nightly, Mary Wilkes MD, 5 mg at 11/12/21 2311    ALPRAZolam (XANAX) tablet 0.5 mg, 0.5 mg, Oral, Nightly PRN, Mary Wilkes MD, 0.5 mg at 11/12/21 1129  Past Medical History:   Diagnosis Date    Diabetes mellitus ([facility]     Gout     Heart failure ([facility]     Hyperlipidemia     Hypertension      Past Surgical History:   Procedure Laterality Date    BACK SURGERY      FOOT SURGERY      KNEE SURGERY         FAMILY HISTORY:  Reviewed. No significant family history.     REVIEW OF SYSTEMS: All pertinent positive and negative findings have been reviewed in HPI. Otherwise, all all systems are reviewed and are negative. PHYSICAL EXAM:    VITALS:  BP (!) 143/74   Pulse 100   Temp 97.8 °F (36.6 °C) (Oral)   Resp 20   Ht 6' 2\" (1.88 m)   Wt 221 lb 1.9 oz (100.3 kg)   SpO2 99%   BMI 28.39 kg/m²   WELL DEVELOPED WELL NOURISHED. NO PERIPHERAL EDEMA. NO ACUTE RESPIRATORY DISTRESS. ORIENTED X 3.  VOICE: Normal  HEARING BY CONFRONTATION IS NORMAL. HEAD AND FACE: Normal  EXTERNAL EARS AND NOSE : Normal  EXTRAOCULAR MUSCLES:  Intact  FACIAL MUSCLES: Normal strength  FACE PALPATION: Normal  OTOSCOPY: Normal tympanic membranes and middle ears  INTRANASAL: Septum midline. meati clear. Turbinates normal.  LIPS, TEETH, GINGIVA: Normal  BUCCAL MUCOSA: Normal  PHARYNX: Normal  NECK: No masses or adenopathy  SALIVARY GLANDS: Normal  THYROID: Normal  NASOPHARYNX, HYPOPHARYNX, LARYNX: No indication for examination based on symptoms. POSITIONAL TESTING: Vertigo with nystagmus head down to right Latency and fatigability. IMPRESSION: Benign positional vertigo involving the right ear. RECOMMENDATIONS: I will reevaluate the patient in my office upon discharge and give him home exercises designed for around canalith repositioning.

## 2021-11-13 NOTE — DISCHARGE SUMMARY
Hospital Medicine Discharge Summary    Patient ID: Taqueria Almanza III      Patient's PCP: Jennifer Costello MD    Admit Date: 11/12/2021     Discharge Date:   11/13/2021    Admitting Physician: Angeles Stewart MD     Discharge Physician: Brenda Moya MD     Discharge Diagnoses: Active Hospital Problems    Diagnosis Date Noted    Dizziness [R42] 11/12/2021       The patient was seen and examined on day of discharge and this discharge summary is in conjunction with any daily progress note from day of discharge. Hospital Course: 28-year-old gentleman with history of nonischemic cardiomyopathy, diabetes mellitus type 2 presented to outside ER with a complaint of dizzy while driving to work and worsened with turning his head around. Presented to [place] ER he was noted to have positive Union-Hallpike maneuver on the right side in the ER. Troponin was elevated four 0.03 admitted with cardiology consultation due to history of nonischemic cardiomyopathy. Patient denies chest pain, shortness of breath, nausea, vomiting. Medically stable to be discharged after clearance from cardiology. ENT was consulted due to his persistent symptoms related to BPPV recommended outpatient follow-up. Final diagnosis  #Dizziness secondary to BPPV  #Elevated troponin with history of nonischemic cardiomyopathy  #Diabetes mellitus type 2          Physical Exam Performed:     /65   Pulse 88   Temp 98.8 °F (37.1 °C) (Oral)   Resp 18   Ht 6' 2\" (1.88 m)   Wt 221 lb 1.9 oz (100.3 kg)   SpO2 97%   BMI 28.39 kg/m²       General appearance:  No apparent distress, appears stated age and cooperative. HEENT:  Normal cephalic, atraumatic without obvious deformity. Pupils equal, round, and reactive to light. Extra ocular muscles intact. Conjunctivae/corneas clear. Neck: Supple, with full range of motion. No jugular venous distention. Trachea midline. Respiratory:  Normal respiratory effort.  Clear to auscultation, tablet Take 0.125 mcg by mouth daily      folic acid (FOLVITE) 1 MG tablet Take 1 mg by mouth daily      hydrocortisone 1 % cream Apply topically 2 times daily Apply topically 2 times daily. FARXIGA 10 MG tablet Take 10 mg by mouth daily      chlorproMAZINE (THORAZINE) 25 MG tablet Take 25 mg by mouth daily      hydrALAZINE (APRESOLINE) 50 MG tablet Take 50 mg by mouth every 6 hours      isosorbide dinitrate (ISORDIL) 40 MG tablet Take 40 mg by mouth 3 times daily      meclizine (ANTIVERT) 12.5 MG tablet Take 12.5 mg by mouth 3 times daily as needed      ALLOPURINOL PO Take 300 mg by mouth daily       hydrochlorothiazide (HYDRODIURIL) 12.5 MG tablet Take 50 mg by mouth daily       Multiple Vitamins-Minerals (CENTRUM SILVER PO) Take  by mouth.      potassium chloride (KLOR-CON) 10 MEQ extended release tablet Take 10 mEq by mouth daily      metFORMIN (GLUCOPHAGE) 500 MG tablet Take 500 mg by mouth 2 times daily (with meals). Time Spent on discharge is more than 20 minutes in the examination, evaluation, counseling and review of medications and discharge plan. This chart was likely completed using voice recognition technology and may contain unintended grammatical , phraseology,and/or punctuation errors      Signed:    German Pa MD   11/13/2021      Thank you Anthony Gonsalez MD for the opportunity to be involved in this patient's care.  If you have any questions or concerns please feel free to contact me at [phone].'

## 2021-11-13 NOTE — H&P
shortness of breath. Abs at outside hospital are also suggestive of slightly elevated troponin and also elevated LFTs. He does admits to drinking beers on it daily basis. History obtained from patient and going over the chart. Patient had a positive Austin-Hallpike maneuver on the right side in the ER    REVIEW OF SYSTEMS:   All other ROS negative except mentioned in [address]      Past Medical History:   has a past medical history of Diabetes mellitus ([address] Gout, Heart failure ([facility] [address] Hyperlipidemia, and Hypertension. Past Surgical History:   has a past surgical history that includes back surgery; Foot surgery; and knee surgery. Medications:  No current facility-administered medications on file prior to encounter. Current Outpatient Medications on File Prior to Encounter   Medication Sig Dispense Refill    aspirin 81 MG chewable tablet Take 81 mg by mouth daily      carvedilol (COREG) 25 MG tablet Take 25 mg by mouth 2 times daily (with meals)      Dapagliflozin-metFORMIN HCl ER  MG TB24 Take by mouth      diclofenac (VOLTAREN) 0.1 % ophthalmic solution 1 drop 3 times daily      digoxin (LANOXIN) 125 MCG tablet Take 0.125 mcg by mouth daily      folic acid (FOLVITE) 1 MG tablet Take 1 mg by mouth daily      hydrocortisone 1 % cream Apply topically 2 times daily Apply topically 2 times daily.  famotidine (PEPCID) 20 MG tablet Take 1 tablet by mouth 2 times daily for 3 days 6 tablet 0    amLODIPine (NORVASC) 5 MG tablet Take 1 tablet by mouth daily for 15 days 15 tablet 2    metFORMIN (GLUCOPHAGE) 500 MG tablet Take 500 mg by mouth 2 times daily (with meals).  ALLOPURINOL PO Take 300 mg by mouth daily       hydrochlorothiazide (HYDRODIURIL) 12.5 MG tablet Take 50 mg by mouth daily       Multiple Vitamins-Minerals (CENTRUM SILVER PO) Take  by mouth. Allergies:   Allergies   Allergen Reactions    Amlodipine Other (See Comments)     Leg swelling  Leg swelling  Leg swelling  Leg swelling      Lisinopril         Social History:  Patient Lives with his wife   reports that he has quit smoking. He has never used smokeless tobacco. He reports previous alcohol use. He reports that he does not use drugs. Family History:  family history is not on file. ,Mother and father  of heart attack    Physical Exam:  BP (!) 151/77   Pulse 90   Temp 98.2 °F (36.8 °C) (Oral)   Resp 18   Ht 6' 2\" (1.88 m)   Wt 216 lb (98 kg)   SpO2 95%   BMI 27.73 kg/m²     General appearance:  Appears comfortable. Well nourished  Eyes: Sclera clear, pupils equal  ENT: Moist mucus membranes, no thrush. Trachea midline. Cardiovascular: Regular rhythm, normal S1, S2. No murmur, gallop, rub. No edema in lower extremities  Respiratory: Clear to auscultation bilaterally, no wheeze, good inspiratory effort  Gastrointestinal: Abdomen soft, non-tender, not distended, normal bowel sounds  Musculoskeletal: No cyanosis in digits, neck supple  Neurology: Cranial nerves grossly intact. Alert and oriented in time, place and person. No speech or motor deficits  Psychiatry: Appropriate affect. Not agitated  Skin: Warm, dry, normal turgor, no rash  Brisk capillary refill, peripheral pulses palpable   Labs:  CBC:   Lab Results   Component Value Date    WBC 7.8 2021    RBC 4.08 2021    HGB 11.1 2021    HCT 33.4 2021    MCV 81.8 2021    MCH 27.1 2021    MCHC 33.2 2021    RDW 17.0 2021     2021    MPV 8.8 2021     BMP:    Lab Results   Component Value Date     2021    K 3.2 2021     2021    CO2 24 2021    BUN 14 2021    CREATININE 1.2 2021    CALCIUM 9.3 2021    GFRAA >60 2021    LABGLOM >60 2021    GLUCOSE 191 2021     XR CHEST PORTABLE   Final Result      1. No acute disease.               Chest Xray:   EKG:    I visualized CXR images and EKG strips normal sinus rhythm    Discussed case  with ER provider at outside hospital      Please note that some part of this chart was generated using Dragon dictation software. Although every effort was made to ensure the accuracy of this automated transcription, some errors in transcription may have occurred inadvertently. If you may need any clarification, please do not hesitate to contact me through [facility].        Kassandra Mederos MD    11/12/2021 7:28 PM

## 2021-11-13 NOTE — PROGRESS NOTES
Pt arrived to unit via transport. Report given by transport team. Will give report to night shift RN.

## 2021-11-13 NOTE — PROGRESS NOTES
Reviewed d/c instructions with pt and wife verbalized their understanding per teach back method. aware no new meds, and to make follow up appointments as requested. D/ c tel.  SL transport per W/C home with wife

## 2021-11-13 NOTE — PLAN OF CARE
Problem: OXYGENATION/RESPIRATORY FUNCTION  Goal: Patient will achieve/maintain normal respiratory rate/effort  Description: Respiratory rate and effort will be within normal limits for the patient  Outcome: Met This Shift     Problem: HEMODYNAMIC STATUS  Goal: Patient has stable vital signs and fluid balance  Outcome: Met This Shift     Problem: FLUID AND ELECTROLYTE IMBALANCE  Goal: Fluid and electrolyte balance are achieved/maintained  Outcome: Met This Shift  Note: Receiving K+ supp.      Problem: ACTIVITY INTOLERANCE/IMPAIRED MOBILITY  Goal: Mobility/activity is maintained at optimum level for patient  Outcome: Met This Shift

## 2022-06-23 ENCOUNTER — OFFICE VISIT (OUTPATIENT)
Dept: ORTHOPEDIC SURGERY | Age: 62
End: 2022-06-23

## 2022-06-23 VITALS — HEIGHT: 74 IN | WEIGHT: 221 LBS | BODY MASS INDEX: 28.36 KG/M2

## 2022-06-23 DIAGNOSIS — M76.62 ACHILLES TENDINITIS OF LEFT LOWER EXTREMITY: Primary | ICD-10-CM

## 2022-06-23 PROCEDURE — 99203 OFFICE O/P NEW LOW 30 MIN: CPT | Performed by: ORTHOPAEDIC SURGERY

## 2022-06-23 NOTE — PATIENT INSTRUCTIONS
ACHILLES TENDON STRETCHING PROGRAM       Hello and welcome to the most important stretch that you can do for your lower legs and feet. The achilles tendon is 9 times stronger than any other lower leg tendon. When this tendon gets tight, it causes a cascade of gait changes that can cause injury to other tendons and joints in the foot. Our stretching program is aimed at treating the primary problem, achilles tendon tightness. In turn, the problems that you are experiencing, which are being caused or exacerbated by the tightness, will improve and resolve in time. Stretching your achilles tendon is the best maintenance you can do for your foot. Achilles tendon tightness develops gradually in all of us as time goes by. Some people have a genetic propensity to get tighter more quickly and at a younger age. Others may perform activities or sports that accelerate the tightness. The exact cause is not critical.  What is critical is diagnosing the tightness and treating it. Untreated achilles tendon tightness can lead to a host of problems including:  plantar fasciitis, achilles tendinitis, midfoot arthritis, metatarsalgia, and hammertoes to name just a few. The stretching program outlined below  has been validated hundreds and hundreds of times over the last 17 years of our practice. Try to follow the stretching protocol as closely as you can. It is designed to gradually improve your flexibility safely and comfortably. Some patients may require as little as 3 weeks to see improvement, while others may need upwards of 5-6 months to break through a long standing Achilles contracture. This stretching should be done 3 times a day and divided roughly throughout the day. You should begin the stretch at 15 seconds three times daily and gradually increase your stretching as explained later in the handout. Within 2 months you will be stretching 9 minutes a day.       The stretch can be performed on the edge is ultimately 3 minutes 3 times per day, EVERY DAY.  Do the stretching in one session or cluster. Think of it as doing three sets, and each time you are on the One Stretch being the repetitions in that particular set. After each set (15 sec., 3 min. , etc.) is completed, take a brief rest (brush your teeth, please), and then on to the next set. Complete all three sets and you are finished for the day. There is no benefit to spreading these exercises throughout the day, even though this would seem logical. However this method makes it easy to forget and quit.  A light to moderate stretch that you feel in your calf is as good as a hard intense stretch. Be Patient!  You can decide for yourself the length and number of sets you want to do, but this is our recommended stretching protocol. Suggested Starting Points and Weekly Progression  Most people start right out at 3 minutes 3 time per day. However, we recommend considering a more conservative, safer start using shorter times and building up especially considering your age, health status, and current state of fitness as shown in the graphic below. Of course if you find the start too slow you can advance as tolerated. The Finer Points: More Detail  Calf stretching is very important for a wide variety of people whether you have a problem or not. And there is absolutely no better or safer way to do calf stretching than on the One Stretch. The ultimate goal would be for us all to stretch our calves everyday and prevent the majority of foot and ankle problems before they occur. Now that would be a novel concept, wouldnt it. There are a whole variety of treatments for the problems that tight calves may cause, a few of which you may have already tried. These would include physical therapy, orthotics, rest, immobilization, injections, and non-steroidal anti-inflammatory medicines.  While these treatments may help the symptoms, none correct the actual cause, your calf that is too tight, and therefore, are usually not long-lasting. First of all, you must know that the primary cause of the majority of foot and ankle problems is due to our calves that have become too tight as we age. Usually the calf contracture is silent, so you are not aware. The calf muscle and Achilles tendon are a continuous structure on the back of the leg, which in turn causes increased stress on most areas of your foot and ankle when the calf is too tight. As people age, tightness (contracture) is almost inevitable. There are several reasons this occurs such as:  1. Decreased daily activities. As we become more sedentary, we have less daily stretch of the calf muscles. 2. Age-related decrease in the elasticity of the calf connective tissue. 3. Higher heeled shoes will put the calf in a shortened position and it tends to stay there. 4. Athletes/runners calves shorten for reasons outside the scope of this handout. Our treatment is aimed at solving the primary problem of calf contractures with simple static calf stretching. In turn, your problem, which is due to or aggravated by calf contractures, will improve or resolve completely. Simple calf stretching is the treatment of choice and will generally obtain satisfactory to complete relief in better than 95% of the patients. Some patients may require as little as 2 weeks to see improvement while others may need upwards of 5-6 months to break through a long standing calf contracture. In the beginning the amount you stretch will vary due to pain or soreness of the calf muscles, heels, or other problem we are treating. This stretch will be done with both feet, therefore you may experience increased new pain in both heels, but this new pain will resolve as well. This stretching should be done three times a day; building up to three minutes of hang time for each session or set as detailed on the chart above.  For the best consistency do the stretching sessions like sets or in a cluster; do your first stretch session and take a break for 1-2 minutes (e.g., take your shower) then the next stretch session and so on. That way you are done for the day and you are less likely to miss. With your back against the wall and your knees straight, place the arch of your feet on the One Stretch (it is best to wear tennis shoes or rubber soled shoes in order to gain better traction to secure your feet) and slowly relax your ankles, letting your heels go downward. If you are in the correct position you should feel a pulling or tightness in your upper calf muscle, below the knee. It is OK to feel some mild pain. The amount of time you start at is dependent on your age, fitness, and any current health issues (see chart above). For instance, if you are young, fit, and healthy you might start at 3 minutes right away. Of course it is always best to use caution when choosing your starting point. Do the three sessions per day gradually increasing the amount of hang time. At times you may need to stay at the same amount of hang time for a few extra days. As you become accustomed to that particular amount of hang time you will need to increase it gradually in the same manner until you reach the maximum of 3 minutes each session. This is a gradual process and although you may want to get right to three minutes of hang time it may not be advisable to do so as you may cause yourself unnecessary pain. Also, keep the intensity of your stretch reasonable. A light to moderate stretch that you feel in your calf is as good as a hard intense stretch. The length of time you stretch has been found to be much more important than the intensity of the stretch. Be Patient! Dont over do it.   The majority of patients will experience new or a slight increase in pain somewhere between 2-6 weeks after beginning the stretching program. This is definitely expected and is usually only minor increase in pain and should not be excruciating. If it becomes severe, please consult your physician. If you continue the stretching program and work through this pain, it typically begins to resolve within a few weeks. As you progress with the stretching program, your response will have an up-and-down course (you will have some good days and bad days)--this is expected and normal. While not every patients symptoms are relieved completely, usually a satisfactory result is obtained within one to four months. Once you have obtained satisfactory relief, we recommend that the stretching is continued FOREVER.       ACHILLES TENDON STRETCHING PROGRAM      Week I - 15 seconds 3 times/day Week II - -30 seconds 3 times/day  Week III - -1 minute 3 times/day  Week IV - -1 ½ minutes 3 times/day  Week V - -2 minutes 3 times/day  Week VI - -2 ½ minutes 3 times/day  Week VII 3 minutes 3 times/day   for life, never stop                         Then maintenance of 3 minutes 1-2 times/day for life

## 2022-06-23 NOTE — PROGRESS NOTES
[facility]  Outpatient Progress Note  Turner Gatica MD    Patient Name: Renzo Sam MRN: [id number]   Age: 58 y.o. YOB: 1960   Sex: male      [address] Complaint   Patient presents with    Ankle Pain     lt achilles pain x 2-3 weeks posterior h/o repair llv tosi 4/2017 no recent imaging        HISTORY OF PRESENT ILLNESS   Chelsy Arguello III is a 58 y.o. male is a previous patient of mine at [facility] who had undergone repair of a left Achilles tendon rupture in May 2811 complicated by postoperative wound infection and rerupture. Eventually the repair healed and he returned to work as a . I have not seen him since 2017. He reports that he has developed mild discomfort in the mid substance of the left Achilles tendon over the last couple of months. He does not feel pain with every step he takes but does have occasional sharp pain in the mid substance of the tendon which is self-limited. He has had no injury. He has also been recently diagnosed with congestive heart failure    PAST MEDICAL HISTORY      Past Medical History:   Diagnosis Date    Diabetes mellitus ([facility] [address]     Gout     Heart failure ([facility] [address]     Hyperlipidemia     Hypertension        PAST SURGICAL HISTORY     Past Surgical History:   Procedure Laterality Date    ACHILLES TENDON SURGERY Left 04/2017    BACK SURGERY      FOOT SURGERY      KNEE SURGERY         MEDICATIONS     Current Outpatient Medications   Medication Sig Dispense Refill    aspirin 81 MG chewable tablet Take 81 mg by mouth daily      carvedilol (COREG) 25 MG tablet Take 25 mg by mouth 2 times daily (with meals)      digoxin (LANOXIN) 125 MCG tablet Take 0.125 mcg by mouth daily      folic acid (FOLVITE) 1 MG tablet Take 1 mg by mouth daily      hydrocortisone 1 % cream Apply topically 2 times daily Apply topically 2 times daily.       chlorproMAZINE (THORAZINE) 25 MG tablet Take 25 mg by mouth daily      hydrALAZINE (APRESOLINE) 50 MG tablet Take 50 mg by mouth every 6 hours      isosorbide dinitrate (ISORDIL) 40 MG tablet Take 40 mg by mouth 3 times daily      meclizine (ANTIVERT) 12.5 MG tablet Take 12.5 mg by mouth 3 times daily as needed      potassium chloride (KLOR-CON) 10 MEQ extended release tablet Take 10 mEq by mouth daily      ALLOPURINOL PO Take 300 mg by mouth daily       hydrochlorothiazide (HYDRODIURIL) 12.5 MG tablet Take 50 mg by mouth daily       Multiple Vitamins-Minerals (CENTRUM SILVER PO) Take  by mouth.  Dapagliflozin-metFORMIN HCl ER  MG TB24 Take by mouth (Patient not taking: Reported on 6/23/2022)      diclofenac (VOLTAREN) 0.1 % ophthalmic solution 1 drop 3 times daily (Patient not taking: Reported on 6/23/2022)      FARXIGA 10 MG tablet Take 10 mg by mouth daily (Patient not taking: Reported on 6/23/2022)      metFORMIN (GLUCOPHAGE) 500 MG tablet Take 500 mg by mouth 2 times daily (with meals). (Patient not taking: Reported on 6/23/2022)       No current facility-administered medications for this visit. ALLERGIES     Allergies   Allergen Reactions    Lisinopril Angioedema    Amlodipine Other (See Comments)     Leg swelling  Leg swelling  Leg swelling  Leg swelling         FAMILY HISTORY   No family history on file.     SOCIAL HISTORY     Social History     Socioeconomic History    Marital status: Single     Spouse name: Not on file    Number of children: Not on file    Years of education: Not on file    Highest education level: Not on file   Occupational History    Not on file   Tobacco Use    Smoking status: Former Smoker    Smokeless tobacco: Never Used   Substance and Sexual Activity    Alcohol use: Not Currently     Comment: daily beers    Drug use: No    Sexual activity: Not on file   Other Topics Concern    Not on file   Social History Narrative    Not on file     Social Determinants of Health     Financial Resource Strain:    [place]ley Difficulty of Paying Living Expenses: Not on file   Food Insecurity:     Worried About Running Out of Food in the Last Year: Not on file    Ran Out of Food in the Last Year: Not on file   Transportation Needs:     Lack of Transportation (Medical): Not on file    Lack of Transportation (Non-Medical):  Not on file   Physical Activity:     Days of Exercise per Week: Not on file    Minutes of Exercise per Session: Not on file   Stress:     Feeling of Stress : Not on file   Social Connections:     Frequency of Communication with Friends and Family: Not on file    Frequency of Social Gatherings with Friends and Family: Not on file    Attends Jehovah's witness Services: Not on file    Active Member of [address] [place] [address] or Organizations: Not on file    Attends Club or Organization Meetings: Not on file    Marital Status: Not on file   Intimate Partner Violence:     Fear of Current or Ex-Partner: Not on file    Emotionally Abused: Not on file    Physically Abused: Not on file    Sexually Abused: Not on file   Housing Stability:     Unable to Pay for Housing in the Last Year: Not on file    Number of [place] in the Last Year: Not on file    Unstable Housing in the Last Year: Not on file       REVIEW OF SYSTEMS   General: no fever, chills, night sweats, anorexia, malaise, fatigue, or weight change  Hematologic:  no unexplained bleeding or bruising  HEENT:   no nasal congestion, rhinorrhea, sore throat, or facial pain  Respiratory:  no cough, dyspnea, or chest pain  Cardiovascular:  no angina, LOVE, PND, orthopnea, dependent edema, or palpitations  Gastrointestinal:  no nausea, vomiting, diarrhea, constipation, or abdominal pain  Genitourinary:  no urinary urgency, frequency, dysuria, or hematuria  Musculoskeletal: see HPI  Endocrine:  no heat or cold intolerance and no polyphagia, polydipsia, or polyuria  Skin:  no skin eruptions or changing lesions  Neurologic:  no focal weakness, numbness/tingling, tremor, or severe headache. See HPI. See HPI for pertinent positives. PHYSICAL EXAM   Vital Signs:   Vitals:    06/23/22 1124   Weight: 221 lb (100.2 kg)   Height: 6' 2\" (1.88 m)       General appearance: healthy, alert, no distress  Skin: Skin color, texture, turgor normal. No rashes or lesions  HEENT: atraumatic, normocephalic. PERRL  Respiratory: Unlabored breathing  Lymphatic: No adenopathy   Neuro: Alert and oriented, normal distal sensation, normal bilateral DTRs  Vascular: Normal distal capillary and distal pulses  Muskuloskeletal Exam: Left ankle has no significant swelling. His previous surgical incision is well-healed. The tendon is palpably intact. Healy sign is negative. There is just mild tenderness to palpation in the mid substance of the    RADIOLOGY   X-rays obtained and reviewed in office:  Views single lateral x-ray of the left ankle shows no significant acute abnormality        IMPRESSION     1. Achilles tendinitis of left lower extremity         PLAN   I had a lengthy discussion with patient today regarding diagnosis and treatment options and recommendations. Recommend shoewear modification, topical anti-inflammatory medication and an Achilles tendon stretching program.  If his pain symptoms increase I would recommend immobilization in a removable walking cast boot    FOLLOWUP     Return if symptoms worsen or fail to improve. Orders Placed This Encounter   Procedures    XR ANKLE LEFT (2 VIEWS)     Standing Status:   Future     Number of Occurrences:   1     Standing Expiration Date:   6/23/2023     Scheduling Instructions:      Lateral ankle only 1 view      No orders of the defined types were placed in this encounter.       Patient was instructed on appropriate use of braces, participation in home exercise programs, healthy lifestyle choices and weight loss as appropriate     Brandon Flores MD

## 2024-12-23 ENCOUNTER — HOSPITAL ENCOUNTER (EMERGENCY)
Age: 64
Discharge: HOME | End: 2024-12-23
Attending: EMERGENCY MEDICINE
Payer: COMMERCIAL

## 2024-12-23 VITALS
TEMPERATURE: 98.2 F | WEIGHT: 268 LBS | DIASTOLIC BLOOD PRESSURE: 64 MMHG | OXYGEN SATURATION: 96 % | BODY MASS INDEX: 34.41 KG/M2 | RESPIRATION RATE: 18 BRPM | SYSTOLIC BLOOD PRESSURE: 135 MMHG | HEART RATE: 98 BPM

## 2024-12-23 DIAGNOSIS — R06.6 HICCUPS: Primary | ICD-10-CM

## 2024-12-23 PROCEDURE — 96372 THER/PROPH/DIAG INJ SC/IM: CPT

## 2024-12-23 PROCEDURE — 99284 EMERGENCY DEPT VISIT MOD MDM: CPT

## 2024-12-23 PROCEDURE — 6370000000 HC RX 637 (ALT 250 FOR IP): Performed by: EMERGENCY MEDICINE

## 2024-12-23 PROCEDURE — 6360000002 HC RX W HCPCS: Performed by: EMERGENCY MEDICINE

## 2024-12-23 RX ORDER — CHLORPROMAZINE HYDROCHLORIDE 25 MG/1
25 TABLET, FILM COATED ORAL 3 TIMES DAILY PRN
Qty: 6 TABLET | Refills: 0 | Status: SHIPPED | OUTPATIENT
Start: 2024-12-23

## 2024-12-23 RX ORDER — PANTOPRAZOLE SODIUM 40 MG/1
40 TABLET, DELAYED RELEASE ORAL ONCE
Status: COMPLETED | OUTPATIENT
Start: 2024-12-23 | End: 2024-12-23

## 2024-12-23 RX ORDER — CHLORPROMAZINE HCI 25 MG/ML
25 INJECTION INTRAMUSCULAR ONCE
Status: DISCONTINUED | OUTPATIENT
Start: 2024-12-23 | End: 2024-12-23

## 2024-12-23 RX ORDER — BACLOFEN 10 MG/1
10 TABLET ORAL ONCE
Status: DISCONTINUED | OUTPATIENT
Start: 2024-12-23 | End: 2024-12-23 | Stop reason: HOSPADM

## 2024-12-23 RX ORDER — METOCLOPRAMIDE HYDROCHLORIDE 5 MG/ML
10 INJECTION INTRAMUSCULAR; INTRAVENOUS ONCE
Status: COMPLETED | OUTPATIENT
Start: 2024-12-23 | End: 2024-12-23

## 2024-12-23 RX ADMIN — PANTOPRAZOLE SODIUM 40 MG: 40 TABLET, DELAYED RELEASE ORAL at 20:59

## 2024-12-23 RX ADMIN — METOCLOPRAMIDE 10 MG: 5 INJECTION, SOLUTION INTRAMUSCULAR; INTRAVENOUS at 20:59

## 2024-12-24 NOTE — ED PROVIDER NOTES
EMERGENCY DEPARTMENT ENCOUNTER     [facility] EMERGENCY DEPARTMENT     Pt Name: Don Hood III   MRN: [id number]   Birthdate 1960   Date of evaluation: 12/23/2024   Provider: Inge Noyola MD   PCP: Vishal Townsend MD   Note Started: 10:58 PM EST 12/23/24     CHIEF COMPLAINT:     Chief Complaint   Patient presents with    Hiccups        HISTORY OF PRESENT ILLNESS:  Adult male who comes in with hiccups.  He has been having hiccups since about Sunday.  It is becoming more and more frequent.  He has had episodes of this in the past approximately 3 times where he had intractable hiccups and needed medications for this.  He cannot recall the name of the medication that he was prescribed in the past.  He denies any chest pain.  No shortness of breath.  No abdominal pain no nausea, vomiting or diarrhea.  He has had no leg swelling or leg pain no palpitations.    PHYSICAL EXAM:    ED Triage Vitals [12/23/24 2037]   BP Systolic BP Percentile Diastolic BP Percentile Temp Temp Source Pulse Respirations SpO2   135/64 -- -- 98.2 °F (36.8 °C) Oral (!) 103 18 96 %      Height Weight - Scale         -- 121.6 kg (268 lb)              Physical Exam  Vitals and nursing note reviewed.   Constitutional:       Appearance: Normal appearance. He is well-developed. He is not ill-appearing.   HENT:      Head: Normocephalic and atraumatic.      Right Ear: External ear normal.      Left Ear: External ear normal.      Nose: Nose normal.   Eyes:      General: No scleral icterus.        Right eye: No discharge.         Left eye: No discharge.      Conjunctiva/sclera: Conjunctivae normal.   Cardiovascular:      Rate and Rhythm: Normal rate and regular rhythm.      Heart sounds: Normal heart sounds.   Pulmonary:      Effort: Pulmonary effort is normal. No respiratory distress.      Breath sounds: Normal breath sounds. No wheezing or rales.   Abdominal:      General: Bowel sounds are normal. There is no distension.